# Patient Record
Sex: MALE | Race: WHITE | Employment: FULL TIME | URBAN - METROPOLITAN AREA
[De-identification: names, ages, dates, MRNs, and addresses within clinical notes are randomized per-mention and may not be internally consistent; named-entity substitution may affect disease eponyms.]

---

## 2021-09-02 ENCOUNTER — OFFICE VISIT (OUTPATIENT)
Dept: SLEEP MEDICINE | Facility: CLINIC | Age: 51
End: 2021-09-02
Payer: COMMERCIAL

## 2021-09-02 VITALS — WEIGHT: 210 LBS | BODY MASS INDEX: 26.95 KG/M2 | HEIGHT: 74 IN

## 2021-09-02 DIAGNOSIS — R06.00 DYSPNEA AND RESPIRATORY ABNORMALITY: Primary | ICD-10-CM

## 2021-09-02 DIAGNOSIS — R06.89 DYSPNEA AND RESPIRATORY ABNORMALITY: Primary | ICD-10-CM

## 2021-09-02 DIAGNOSIS — G47.10 ORGANIC HYPERSOMNIA: ICD-10-CM

## 2021-09-02 PROCEDURE — 99203 OFFICE O/P NEW LOW 30 MIN: CPT | Performed by: INTERNAL MEDICINE

## 2021-09-02 ASSESSMENT — MIFFLIN-ST. JEOR: SCORE: 1872.3

## 2021-09-02 NOTE — PROGRESS NOTES
Sleep Consultation:    Date on this visit: 9/2/2021      Primary Physician: No Ref-Primary, Physician     Chief Complaint   Patient presents with     Sleep Problem     wife sent him, marlen Espinal goes to bed at 9:30 PM during the week. He gets up at 4:00 AM before/without an alarm.  Teddy has difficulty falling asleep 1-2/week for the past 3 weeks due to job stress. He wakes up 0-1 times a night without difficulty falling back to sleep.   On weekends, Teddy goes to sleep at 11:00 PM.  He wakes up at 5:00 AM without an alarm.    He often falls asleep on couch before bedtime. He has always been a short sleeper, but was rested    Patient does use electronics in bed    Teddy does snore snoring is very loud. Patient does have a regular bed partner. There is not report of gasping, choking and snorting.  He does not have witnessed apneas. They always sleep separately. Wife also has sleep issues. Patient sleeps on his back, side and stomach. He has no restless legs, denies no morning headaches.     Teddy denies any sleep walking, sleep talking and dream enactment.    Teddy denies heartburn at night.      Patient's [place] Sleepiness score 11/24 consistent with excessive  daytime sleepiness.      Teddy naps rarely. He takes frequent inadvertant naps.  He admits dozing at stop lights. Patient was counseled on the importance of driving while alert, to pull over if sleepy.     Caffeine use 5-10 (usually 6) Cups before 530 AM most days.     No results for input(s): NA, POTASSIUM, CHLORIDE, CO2, ANIONGAP, GLC, BUN, CR, MYNOR in the last [id number] hours.    Allergies:    Not on File    Medications:    No current outpatient medications on file.       Problem List:  Patient Active Problem List    Diagnosis Date Noted     Tobacco use disorder 08/25/2006     Priority: Medium     Lipoma of torso 12/23/2016     Priority: Low        Past Medical/Surgical History:  No past medical history on file.  Past Surgical History:   Procedure  Laterality Date     HERNIA REPAIR  1996    Hernia Repair, Incisional, Unilateral     ORTHOPEDIC SURGERY  1984    SUBTALAR ATHRODEISIS  Foot/Toe Procedure - Bunion       Social History:  Social History     Socioeconomic History     Marital status:      Spouse name: Not on file     Number of children: Not on file     Years of education: Not on file     Highest education level: Not on file   Occupational History     Occupation: Construction   Tobacco Use     Smoking status: Former Smoker     Packs/day: 0.25     Years: 1.00     Pack years: 0.25     Quit date:      Years since quittin.6     Smokeless tobacco: Current User     Types: Chew   Substance and Sexual Activity     Alcohol use: Yes     Comment: 28/week     Drug use: Not Currently     Sexual activity: Not on file   Other Topics Concern     Not on file   Social History Narrative     Not on file     Social Determinants of Health     Financial Resource Strain:      Difficulty of Paying Living Expenses:    Food Insecurity:      Worried About Running Out of Food in the Last Year:      Ran Out of Food in the Last Year:    Transportation Needs:      Lack of Transportation (Medical):      Lack of Transportation (Non-Medical):    Physical Activity:      Days of Exercise per Week:      Minutes of Exercise per Session:    Stress:      Feeling of Stress :    Social Connections:      Frequency of Communication with Friends and Family:      Frequency of Social Gatherings with Friends and Family:      Attends [employer] Services:      Active Member of Clubs or Organizations:      Attends Club or Organization Meetings:      Marital Status:    Intimate Partner Violence:      Fear of Current or Ex-Partner:      Emotionally Abused:      Physically Abused:      Sexually Abused:        Family History:  Family History   Problem Relation Age of Onset     Diabetes Paternal Grandmother      Coronary Artery Disease No family hx of      Colon Cancer No family hx of   "      Review of Systems:  A complete review of systems reviewed by me is negative with the exeption of what has been mentioned in the history of present illness.  CONSTITUTIONAL:  NEGATIVE for  night sweats  EYES:  NEGATIVE for changes in vision, blind spots and double vision  ENT:  NEGATIVE for  ear pain, sore throat, sinus pain and post-nasal drip  CARDIAC:  NEGATIVE for  fast heart beats, fluttering in chest, chest pain and chest pressure  NEUROLOGIC:  NEGATIVE for  headaches  DERMATOLOGIC:  NEGATIVE for  rashes  PULMONARY:  NEGATIVE for  SOB at rest, SOB with activity, dry cough and productive cough  GASTROINTESTINAL:  NEGATIVE for  vomiting, loose or watery stools, fat or grease in stools and constipation  GENITOURINARY:  NEGATIVE for  pain during urination and urinating more frequently than usual  MUSCULOSKELETAL:  POSITIVE for  bone or joint pain    Physical Examination:  Vitals: Ht 1.88 m (6' 2\")   Wt 95.3 kg (210 lb)   BMI 26.96 kg/m    BMI= Body mass index is 26.96 kg/m .    Neck Cir (cm): 40 cm    [place] Total Score 9/2/2021   Total score - [place] 11       HONG Total Score: 5 (09/02/21 1500)    GENERAL APPEARANCE: healthy, alert and no distress  EYES: Eyes grossly normal to inspection and conjunctivae and sclerae normal  HENT: oropharynx not crowded  NECK: no adenopathy, no asymmetry, masses, or scars and thyroid normal to palpation  RESP: lungs clear to auscultation - no rales, rhonchi or wheezes  CV: regular rates and rhythm, normal S1 S2, no S3 or S4 and no murmur, click or rub  ABDOMEN: flat  MS: extremities normal- no gross deformities noted  NEURO: Normal strength and tone, mentation intact, speech normal and cranial nerves 2-12 intact  PSYCH: mentation appears normal and affect normal/bright  Mallampati Class: I.  Tonsillar Stage: 1  hidden by pillars.        Impression/Plan:    Loud socially disruptive snoring, excessive daytime sleepiness (ESS 11)  - Patient appears to be a good candidate for " Home Sleep Test CIARAG 5     He will follow up with me in approximately two weeks after his sleep study has been competed to review the results and discuss plan of care.       Polysomnography reviewed.  Obstructive sleep apnea reviewed.  Complications of untreated sleep apnea were reviewed.    I spent 35 minutes with patient including counseling, and 10 minutes with chart review, and documentation

## 2021-09-03 NOTE — NURSING NOTE
Attempted;unsuccessful left message to call back to schedule sleep study and follow up appointment.          Arlet Vines KALEB  [facility]

## 2021-12-01 ENCOUNTER — TELEPHONE (OUTPATIENT)
Dept: SLEEP MEDICINE | Facility: CLINIC | Age: 51
End: 2021-12-01
Payer: COMMERCIAL

## 2021-12-01 NOTE — TELEPHONE ENCOUNTER
Pt was called and left a voicemail informing him that his HST pick-up appt has been moved to Mon. 12/06 @3pm

## 2021-12-06 ENCOUNTER — OFFICE VISIT (OUTPATIENT)
Dept: SLEEP MEDICINE | Facility: CLINIC | Age: 51
End: 2021-12-06
Payer: COMMERCIAL

## 2021-12-06 DIAGNOSIS — R06.89 DYSPNEA AND RESPIRATORY ABNORMALITY: Primary | ICD-10-CM

## 2021-12-06 DIAGNOSIS — G47.10 ORGANIC HYPERSOMNIA: ICD-10-CM

## 2021-12-06 DIAGNOSIS — R06.00 DYSPNEA AND RESPIRATORY ABNORMALITY: Primary | ICD-10-CM

## 2021-12-06 PROCEDURE — G0399 HOME SLEEP TEST/TYPE 3 PORTA: HCPCS | Mod: 52 | Performed by: INTERNAL MEDICINE

## 2021-12-07 ENCOUNTER — APPOINTMENT (OUTPATIENT)
Dept: SLEEP MEDICINE | Facility: CLINIC | Age: 51
End: 2021-12-07
Payer: COMMERCIAL

## 2021-12-08 NOTE — PROGRESS NOTES
This HSAT was performed using a Noxturnal T3 device which recorded snore, sound, movement activity, body position, nasal pressure, oronasal thermal airflow, pulse, oximetry and both chest and abdominal respiratory effort. HSAT data was restricted to the time patient states they were in bed.     HSAT was scored using 1B 4% hypopnea rule.     HST AHI (Non-PAT): 11.7  Snoring was reported as mild.  Time with SpO2 below 89% was 3.6 minutes.   Overall signal quality was good     Pt will follow up with sleep provider to determine appropriate therapy.

## 2021-12-08 NOTE — PROGRESS NOTES
HST POST-STUDY QUESTIONNAIRE    1. What time did you go to bed?  9:45  2. How long do you think it took to fall asleep?  5 min  3. What time did you wake up to start the day?  0350  4. Did you get up during the night at all? yes  5. If you woke up, do you remember approximately what time(s)? 2:45  6. Did you have any difficulty with the equipment?  No  7. Did you us any type of treatment with this study?  None  8. Was the head of the bed elevated? No  9. Did you sleep in a recliner?  No  10. Did you stop using CPAP at least 3 days before this test?  NA  11. Any other information you'd like us to know? no

## 2021-12-08 NOTE — PROCEDURES
"HOME SLEEP STUDY INTERPRETATION    Patient: Teddy Ordoñez  MRN: [id number]  YOB: 1970  Study Date: 2021  PCP/Referring Provider: No Ref-Primary, Physician;   Ordering Provider: Yves Casarez MD     Indications for Home Study: Teddy Ordoñez is a 50 year old male with symptoms suggestive of obstructive sleep apnea.    Estimated body mass index is 26.96 kg/m  as calculated from the following:    Height as of 21: 1.88 m (6' 2\").    Weight as of 21: 95.3 kg (210 lb).  Total score - [place]: 11 (2021  3:00 PM)  STOP-BAN/8    Data: A full night home sleep study was performed recording the standard physiologic parameters including body position, movement, sound, nasal pressure, thermal oral airflow, chest and abdominal movements with respiratory inductance plethysmography, and oxygen saturation by pulse oximetry. Pulse rate was estimated by oximetry recording. This study was considered adequate based on > 4 hours of quality oximetry and respiratory recording. As specified by the AASM Manual for the Scoring of Sleep and Associated events, version 2.3, Rule VIII.D 1B, 4% oxygen desaturation scoring for hypopneas is used as a standard of care on all home sleep apnea testing.    Analysis Time:  240 minutes    Respiration:   Sleep Associated Hypoxemia: sustained hypoxemia was not present. Time with saturation less than or equal to 88% was 1.9 minutes. The lowest oxygen saturation was 85%.   Snoring: Snoring was present.  Respiratory events: The home study revealed a presence of 14 obstructive apneas and 11 mixed and central apneas. There were 22 hypopneas resulting in a combined apnea/hypopnea index [AHI] of 11.7 events per hour.  AHI was 31.7 per hour supine, n/a per hour prone, 4.6 per hour on left side, and 3.1 per hour on right side.   Pattern: Excluding events noted above, respiratory rate and pattern was Normal.    Position: Percent of time spent: supine - 27%, prone - 0%, on left - " 65%, on right - 8%.    Heart Rate: By pulse oximetry normal rate was noted.     Assessment:   Mild obstructive sleep apnea, predominantly supine positional when it is severe  Sleep associated hypoxemia was not present.    Recommendations:  Consider auto-CPAP at 5-15 cmH2O, oral appliance therapy, positional therapy or surgical options.  Suggest optimizing sleep hygiene and avoiding sleep deprivation.  Weight management.    Diagnosis Code(s): Obstructive Sleep Apnea G47.33    Yves Casarez MD, December 8, 2021   Diplomate, American Board of Internal Medicine, Sleep Medicine

## 2022-03-08 ENCOUNTER — VIRTUAL VISIT (OUTPATIENT)
Dept: SLEEP MEDICINE | Facility: CLINIC | Age: 52
End: 2022-03-08
Payer: COMMERCIAL

## 2022-03-08 VITALS — HEIGHT: 74 IN | BODY MASS INDEX: 26.82 KG/M2 | WEIGHT: 209 LBS

## 2022-03-08 DIAGNOSIS — G47.33 OSA (OBSTRUCTIVE SLEEP APNEA): Primary | Chronic | ICD-10-CM

## 2022-03-08 PROCEDURE — 99214 OFFICE O/P EST MOD 30 MIN: CPT | Mod: GT | Performed by: INTERNAL MEDICINE

## 2022-03-08 ASSESSMENT — SLEEP AND FATIGUE QUESTIONNAIRES
HOW LIKELY ARE YOU TO NOD OFF OR FALL ASLEEP WHILE SITTING INACTIVE IN A PUBLIC PLACE: SLIGHT CHANCE OF DOZING
HOW LIKELY ARE YOU TO NOD OFF OR FALL ASLEEP WHILE SITTING AND TALKING TO SOMEONE: WOULD NEVER DOZE
HOW LIKELY ARE YOU TO NOD OFF OR FALL ASLEEP WHEN YOU ARE A PASSENGER IN A CAR FOR AN HOUR WITHOUT A BREAK: WOULD NEVER DOZE
HOW LIKELY ARE YOU TO NOD OFF OR FALL ASLEEP WHILE SITTING AND READING: SLIGHT CHANCE OF DOZING
HOW LIKELY ARE YOU TO NOD OFF OR FALL ASLEEP IN A CAR, WHILE STOPPED FOR A FEW MINUTES IN TRAFFIC: SLIGHT CHANCE OF DOZING
HOW LIKELY ARE YOU TO NOD OFF OR FALL ASLEEP WHILE SITTING QUIETLY AFTER LUNCH WITHOUT ALCOHOL: MODERATE CHANCE OF DOZING
HOW LIKELY ARE YOU TO NOD OFF OR FALL ASLEEP WHILE LYING DOWN TO REST IN THE AFTERNOON WHEN CIRCUMSTANCES PERMIT: MODERATE CHANCE OF DOZING
HOW LIKELY ARE YOU TO NOD OFF OR FALL ASLEEP WHILE WATCHING TV: HIGH CHANCE OF DOZING

## 2022-03-08 NOTE — PROGRESS NOTES
"Teddy is a 51 year old who is being evaluated via a billable video visit.      How would you like to obtain your AVS? MyChart  If the video visit is dropped, the invitation should be resent by: Text to cell phone: [phone]  Will anyone else be joining your video visit? No      Video Start Time: 10:33 AM    Video-Visit Details    Type of service:  Video Visit    Video End Time:10:51 AM    Originating Location (pt. Location): Home    Distant Location (provider location):  [facility]     Platform used for Video Visit: [employer]         Home Sleep Apnea Testing Results Visit:    Chief Complaint   Patient presents with     Study Results       Teddy Ordoñez is a 51 year old male who returns to [facility] after having had Home Sleep Apnea Testing.  He presented with loud socially disruptive snoring, excessive daytime sleepiness (ESS 11)      Estimated body mass index is 26.83 kg/m  as calculated from the following:    Height as of this encounter: 1.88 m (6' 2\").    Weight as of this encounter: 94.8 kg (209 lb).  Total score - [place]: 10 (3/8/2022 10:11 AM)   STOP-BAN/8  HONG Total Score: 1    Home Sleep Apnea Testing - 21: 209 lbs 0 oz: AHI 11.7/hr. Supine AHI 31/hr.   Oxygen Darío of 85%. Sp02 =< 88% for 1.9 minutes  He slept on his back (27%), prone (0%), left (65%) and right (8%) sides.   Analysis time: 240 minutes.     Signal quality of Oxymeter 99% Good  Nasal Cannula 100% Good  RIP belts 100% Good.     Teddy Ordoñez reports that he slept Good .     Home Sleep Apnea Testing was reviewed in detail today with Teddy and a copy given to him for his records.    Past medical/surgical history, family history, social history, medications and allergies were reviewed.        Ht 1.88 m (6' 2\")   Wt 94.8 kg (209 lb)   BMI 26.83 kg/m      Impression/Plan:    Mild obstructive sleep apnea, predominantly supine positional when it is severe  Sleep associated " hypoxemia was not present.     Treatment options discussed today including  auto-CPAP at 5-15 cmH2O, oral appliance therapy, positional therapy or surgical options.    Elected treatment with positional therapy.  - Recommend follow-up Home Sleep Apnea Test  On treatment    I spent 15 minutes with patient including counseling, and 10 minutes with chart review, and documentation *

## 2022-03-08 NOTE — PATIENT INSTRUCTIONS
Positioning Device tess, slumber bump, tennis ball tshirt.   This example shows a pillow that straps around the waist. It may be appropriate for those whose sleep study shows milder sleep apnea that occurs primarily when lying flat on one's back. Preliminary studies have shown benefit but effectiveness at home should be verified.

## 2022-04-09 ENCOUNTER — HEALTH MAINTENANCE LETTER (OUTPATIENT)
Age: 52
End: 2022-04-09

## 2022-10-09 ENCOUNTER — HEALTH MAINTENANCE LETTER (OUTPATIENT)
Age: 52
End: 2022-10-09

## 2023-05-27 ENCOUNTER — HEALTH MAINTENANCE LETTER (OUTPATIENT)
Age: 53
End: 2023-05-27

## 2024-08-03 ENCOUNTER — HEALTH MAINTENANCE LETTER (OUTPATIENT)
Age: 54
End: 2024-08-03